# Patient Record
Sex: MALE | Race: WHITE | URBAN - METROPOLITAN AREA
[De-identification: names, ages, dates, MRNs, and addresses within clinical notes are randomized per-mention and may not be internally consistent; named-entity substitution may affect disease eponyms.]

---

## 2023-05-02 ENCOUNTER — OFFICE VISIT (OUTPATIENT)
Dept: PRIMARY CARE | Facility: CLINIC | Age: 73
End: 2023-05-02
Payer: MEDICARE

## 2023-05-02 VITALS
DIASTOLIC BLOOD PRESSURE: 72 MMHG | HEART RATE: 82 BPM | RESPIRATION RATE: 18 BRPM | WEIGHT: 277 LBS | OXYGEN SATURATION: 97 % | BODY MASS INDEX: 32.85 KG/M2 | SYSTOLIC BLOOD PRESSURE: 116 MMHG

## 2023-05-02 DIAGNOSIS — K58.9 IRRITABLE BOWEL SYNDROME, UNSPECIFIED TYPE: ICD-10-CM

## 2023-05-02 DIAGNOSIS — E55.9 VITAMIN D DEFICIENCY: ICD-10-CM

## 2023-05-02 DIAGNOSIS — I25.2 STATUS POST MYOCARDIAL INFARCTION: ICD-10-CM

## 2023-05-02 DIAGNOSIS — Z00.00 ROUTINE GENERAL MEDICAL EXAMINATION AT HEALTH CARE FACILITY: Primary | ICD-10-CM

## 2023-05-02 DIAGNOSIS — J45.909 ASTHMA, UNSPECIFIED ASTHMA SEVERITY, UNSPECIFIED WHETHER COMPLICATED, UNSPECIFIED WHETHER PERSISTENT (HHS-HCC): ICD-10-CM

## 2023-05-02 DIAGNOSIS — E78.5 HYPERLIPIDEMIA, UNSPECIFIED HYPERLIPIDEMIA TYPE: ICD-10-CM

## 2023-05-02 DIAGNOSIS — I25.10 CORONARY ARTERY DISEASE INVOLVING NATIVE HEART, UNSPECIFIED VESSEL OR LESION TYPE, UNSPECIFIED WHETHER ANGINA PRESENT: ICD-10-CM

## 2023-05-02 DIAGNOSIS — Z86.711 HISTORY OF PULMONARY EMBOLISM: ICD-10-CM

## 2023-05-02 DIAGNOSIS — E53.8 VITAMIN B12 DEFICIENCY: ICD-10-CM

## 2023-05-02 DIAGNOSIS — N40.0 BENIGN PROSTATIC HYPERPLASIA, UNSPECIFIED WHETHER LOWER URINARY TRACT SYMPTOMS PRESENT: ICD-10-CM

## 2023-05-02 DIAGNOSIS — R06.09 DYSPNEA ON EXERTION: ICD-10-CM

## 2023-05-02 DIAGNOSIS — I65.29 OCCLUSION OF CAROTID ARTERY, UNSPECIFIED LATERALITY: ICD-10-CM

## 2023-05-02 DIAGNOSIS — I77.819 AORTIC ECTASIA (CMS-HCC): ICD-10-CM

## 2023-05-02 DIAGNOSIS — I73.9 PERIPHERAL VASCULAR DISEASE (CMS-HCC): ICD-10-CM

## 2023-05-02 DIAGNOSIS — I10 ESSENTIAL HYPERTENSION: ICD-10-CM

## 2023-05-02 PROBLEM — R42 VERTIGO: Status: ACTIVE | Noted: 2023-05-02

## 2023-05-02 PROCEDURE — G0439 PPPS, SUBSEQ VISIT: HCPCS | Performed by: FAMILY MEDICINE

## 2023-05-02 RX ORDER — HYDROCHLOROTHIAZIDE 12.5 MG/1
CAPSULE ORAL
COMMUNITY
Start: 2019-02-26

## 2023-05-02 RX ORDER — CHLORDIAZEPOXIDE HYDROCHLORIDE AND CLIDINIUM BROMIDE 5; 2.5 MG/1; MG/1
CAPSULE ORAL
COMMUNITY
End: 2023-05-02 | Stop reason: ALTCHOICE

## 2023-05-02 RX ORDER — ATORVASTATIN CALCIUM 80 MG/1
TABLET, FILM COATED ORAL
COMMUNITY
Start: 2020-09-21

## 2023-05-02 RX ORDER — HYOSCYAMINE SULFATE 0.38 MG/1
TABLET, EXTENDED RELEASE ORAL
COMMUNITY
Start: 2018-11-07

## 2023-05-02 RX ORDER — ASPIRIN 81 MG/1
TABLET ORAL
COMMUNITY
Start: 2018-04-12

## 2023-05-02 RX ORDER — ALBUTEROL SULFATE 90 UG/1
AEROSOL, METERED RESPIRATORY (INHALATION) EVERY 6 HOURS
COMMUNITY
Start: 2021-03-18

## 2023-05-02 RX ORDER — FLUTICASONE FUROATE AND VILANTEROL 200; 25 UG/1; UG/1
1 POWDER RESPIRATORY (INHALATION) DAILY
COMMUNITY

## 2023-05-02 RX ORDER — LOSARTAN POTASSIUM 25 MG/1
TABLET ORAL
COMMUNITY

## 2023-05-02 RX ORDER — ALBUTEROL SULFATE 0.83 MG/ML
SOLUTION RESPIRATORY (INHALATION)
COMMUNITY

## 2023-05-02 RX ORDER — MONTELUKAST SODIUM 10 MG/1
TABLET ORAL
COMMUNITY
Start: 2021-04-26

## 2023-05-02 ASSESSMENT — ACTIVITIES OF DAILY LIVING (ADL)
DOING_HOUSEWORK: INDEPENDENT
GROCERY_SHOPPING: INDEPENDENT
DRESSING: INDEPENDENT
BATHING: INDEPENDENT
TAKING_MEDICATION: INDEPENDENT
MANAGING_FINANCES: INDEPENDENT

## 2023-05-02 ASSESSMENT — PATIENT HEALTH QUESTIONNAIRE - PHQ9
2. FEELING DOWN, DEPRESSED OR HOPELESS: NOT AT ALL
SUM OF ALL RESPONSES TO PHQ9 QUESTIONS 1 AND 2: 0
1. LITTLE INTEREST OR PLEASURE IN DOING THINGS: NOT AT ALL

## 2023-05-02 ASSESSMENT — ENCOUNTER SYMPTOMS
OCCASIONAL FEELINGS OF UNSTEADINESS: 0
DEPRESSION: 0
LOSS OF SENSATION IN FEET: 0

## 2023-05-02 NOTE — PROGRESS NOTES
Subjective   Reason for Visit: Piero Pires is an 72 y.o. male here for a Medicare Wellness visit.          Reviewed all medications by prescribing practitioner or clinical pharmacist (such as prescriptions, OTCs, herbal therapies and supplements) and documented in the medical record.    HPI    Patient Self Assessment of Health Status  Patient Self Assessment: Good    Nutrition and Exercise  Current Diet: Well Balanced Diet  Adequate Fluid Intake: Yes  Caffeine: Yes  Exercise Frequency: No Exercise         Home Safety Risk Factors: None    Patient Care Team:  Haider Lloyd DO as PCP - General  Haider Lloyd DO as PCP - [employer] PCP     Review of Systems    Objective   Vitals:  /72   Pulse 82   Resp 18   Wt 126 kg (277 lb)   SpO2 97%   BMI 32.85 kg/m²       Physical Exam    Assessment/Plan   Problem List Items Addressed This Visit    None

## 2023-05-02 NOTE — PROGRESS NOTES
Subjective   Reason for Visit: Piero Pires is an 72 y.o. male here for a Medicare Wellness visit.          Reviewed all medications by prescribing practitioner or clinical pharmacist (such as prescriptions, OTCs, herbal therapies and supplements) and documented in the medical record.    HPI  Patient comes in today for Medicare wellness exam.  He is currently without complaints.  He sees Dr. Quijano, pulmonary for his lung issues and sees Dr. Dodge, cardiology for his heart.  He is taking all his medicines as directed.  He states he gets an injection once a month at Dr. Quijano's office for his lungs.        2/7/2023  Patient comes in today for checkup and refill of medications. He continues to have problems with his ears and hearing but he does not yet want to see audiology.    3/16/2022  Patient comes in today for checkup and evaluation of his ears. He was in to see Dr. Quijano his pulmonologist yesterday and was told he has wax in both ears. He is having difficulty hearing but is otherwise without complaints. He states his breathing is still an issue and they have told him that he has had asthma. He states he does use a nebulizer with some success and he has a new inhaler that he takes at bedtime along with the montelukast. He states he has been getting dizzy in the evening and he is not sure what from. The pulmonologist did not think it was from any of his medicines.    6/22/2021  Patient comes in today with his wife for Medicare wellness exam. He continues to carreno shortness of breath and had an appointment with Dr. Quijano his pulmonologist this morning. He was taken off of Trelegy because of voice problems. He continues to see Dr. Pastor, cardiology and Dr. Rojo, cardiology. He otherwise is without complaints. He is taking all his medicines as directed without side effects.    5/21/2020  Patient is seen today as a telemedicine visit rendered via realtime interactive audio/video [email or web address] services as we  "are currently in the middle of a coronavirus pandemic worldwide. The patient was informed about the telehealth clinical encounter including benefits to avoiding travel and limitations to the assessment. In person care may be recommended if needed. Telehealth sessions are not being recorded and personal health information is protected.     Piero presents today for checkup and refill of meds. He currently is without complaints. He states that he is taking his medicines as directed and is not having any side effects from them. He claims he did have lab work recently in January at [facility], [place] for his cardiologist. I will see if I can track them down.    7/10/2019   The patient is a 69 year old male who presents for a Recheck of Medication Evaluation. The patient feels well with no complaints (Pt is here for a follow up on his meds and refills.). Note for \"Medication Evaluation\": Patient comes in today with his wife for checkup and follow-up on his breathing. He went to see pulmonary as I recommended last visit and apparently Dr. Quijano has placed him on Singulair which is helping a little bit. He claims the inhaler that I gave him last visit to use episodically for exercise didn't help. He states Dr. Quijano thinks he may have allergies not asthma.    He complains of occasional trouble breathing ever since his open-heart surgery. This has been going on for 14-16 months. He has been evaluated by cardiology and pulmonary and so far no one can tell him why. He states that with the slightest bit of exercise he gets some shortness of breath. He states he has been told in the past he has a spray of blood clots in his right lung. He is on Xarelto chronically.    7/9/2018   The patient is a 68 year old male who presents with a complaint of a sore throat. The onset of the sore throat has been acute and has been occurring in a persistent pattern for 3 days. The course has been constant. There has been associated runny " "nose, nasal congestion, sore throat, headache, cough and sinus pain, while there has been no fever. Note for \"Sore throat\": patient comes in today with his wife complaining of a sore throat.  He states it began last Friday 7/6/18 and has gotten progressively worse.  he denies fever but has had sinus congestion and drainage which Benadryl has helped 4.  He has a dry hacking cough but denies GI symptoms.    Patient has had a difficult past few months.  He had open heart surgery at [facility] with a CABG x 3 on 4/3/18. his postop course was remarkable for postop atrial fibrillation which was successfully treated with Iv, then oral amiodarone and oral beta blockers. since that time he had a pulmonary embolus and kidney stones.  He also developed allergies to the amiodarone and metoprolol.  He is currently on Xarelto 20 mg daily.    1/2/18  Patient comes in today for checkup and refill of meds. He currently is without complaints. He states that he is taking his medicines as directed and is not having any side effects from them. The hyoscyamine seems to be working okay for his irritable bowel syndrome and he would like a refill. He is also in need of lab work. He is not fasting.    Patient would also like to try medication for erectile dysfunction. He was given a prescription for Viagra from his cardiologist but it wound up being too expensive.     He is a former .     Patient Care Team:  Haider Lloyd DO as PCP - General  Haider Lloyd DO as PCP - [employer] PCP     Review of Systems   All other systems reviewed and are negative.      Objective   Vitals:  /72   Pulse 82   Resp 18   Wt 126 kg (277 lb)   SpO2 97%   BMI 32.85 kg/m²       Physical Exam  Vitals reviewed.   Constitutional:       Appearance: He is well-developed.   HENT:      Head: Normocephalic and atraumatic.      Right Ear: Tympanic membrane, ear canal and external ear normal.      Left Ear: Tympanic " membrane, ear canal and external ear normal.      Nose: Nose normal.      Mouth/Throat:      Mouth: Mucous membranes are moist.      Pharynx: Oropharynx is clear.   Eyes:      Extraocular Movements: Extraocular movements intact.      Conjunctiva/sclera: Conjunctivae normal.      Pupils: Pupils are equal, round, and reactive to light.   Cardiovascular:      Rate and Rhythm: Normal rate and regular rhythm.      Heart sounds: Normal heart sounds. No murmur heard.  Pulmonary:      Effort: Pulmonary effort is normal.      Breath sounds: Normal breath sounds. No wheezing.   Abdominal:      General: Abdomen is flat. Bowel sounds are normal.      Palpations: Abdomen is soft.   Musculoskeletal:         General: Normal range of motion.   Skin:     General: Skin is warm and dry.   Neurological:      General: No focal deficit present.      Mental Status: He is alert and oriented to person, place, and time. Mental status is at baseline.   Psychiatric:         Mood and Affect: Mood normal.         Behavior: Behavior normal.         Thought Content: Thought content normal.         Judgment: Judgment normal.         Assessment/Plan   Problem List Items Addressed This Visit       Aortic ectasia (CMS/HCC)    Current Assessment & Plan     Follows with Dr. Dodge, cardiology         Asthma    Carotid artery occlusion    Coronary artery disease    Dyspnea on exertion    Essential hypertension    Relevant Orders    Comprehensive Metabolic Panel    History of pulmonary embolism    Hyperlipidemia    Relevant Orders    Lipid Panel    Irritable bowel syndrome    Peripheral vascular disease (CMS/HCC)    Current Assessment & Plan     Stable, no complaints.         Status post myocardial infarction    Vitamin D deficiency    Relevant Orders    Vitamin D, Total     Other Visit Diagnoses       Routine general medical examination at health care facility    -  Primary    Relevant Orders    Comprehensive Metabolic Panel    Vitamin B12 deficiency         Relevant Orders    CBC    Vitamin B12    Benign prostatic hyperplasia, unspecified whether lower urinary tract symptoms present        Relevant Orders    Prostate Specific Antigen        Will contact patient with lab results when available.  Continue follow-up with cardiology and pulmonary as scheduled.  Continue current meds as directed.  Follow up in 6 months for recheck if all remains stable, sooner if problems arise.  Medication list reconciled.  Thank you for visiting today!      Professional services: Some of this note was completed using Dragon voice technology and sometimes the software misinterprets words. This may include unintended errors with respect to translation of words, typographical errors or grammar errors which may not have been identified prior to finalization of the chart note. Please take this into account when reading the note. Thank you.

## 2023-05-09 ENCOUNTER — LAB (OUTPATIENT)
Dept: LAB | Facility: LAB | Age: 73
End: 2023-05-09
Payer: MEDICARE

## 2023-05-09 DIAGNOSIS — I10 ESSENTIAL HYPERTENSION: ICD-10-CM

## 2023-05-09 DIAGNOSIS — E53.8 VITAMIN B12 DEFICIENCY: ICD-10-CM

## 2023-05-09 DIAGNOSIS — Z00.00 ROUTINE GENERAL MEDICAL EXAMINATION AT HEALTH CARE FACILITY: ICD-10-CM

## 2023-05-09 DIAGNOSIS — N40.0 BENIGN PROSTATIC HYPERPLASIA, UNSPECIFIED WHETHER LOWER URINARY TRACT SYMPTOMS PRESENT: ICD-10-CM

## 2023-05-09 DIAGNOSIS — E55.9 VITAMIN D DEFICIENCY: ICD-10-CM

## 2023-05-09 DIAGNOSIS — E78.5 HYPERLIPIDEMIA, UNSPECIFIED HYPERLIPIDEMIA TYPE: ICD-10-CM

## 2023-05-09 LAB
ALANINE AMINOTRANSFERASE (SGPT) (U/L) IN SER/PLAS: 11 U/L (ref 10–52)
ALBUMIN (G/DL) IN SER/PLAS: 4.2 G/DL (ref 3.4–5)
ALKALINE PHOSPHATASE (U/L) IN SER/PLAS: 62 U/L (ref 33–136)
ANION GAP IN SER/PLAS: 14 MMOL/L (ref 10–20)
ASPARTATE AMINOTRANSFERASE (SGOT) (U/L) IN SER/PLAS: 17 U/L (ref 9–39)
BILIRUBIN TOTAL (MG/DL) IN SER/PLAS: 0.8 MG/DL (ref 0–1.2)
CALCIDIOL (25 OH VITAMIN D3) (NG/ML) IN SER/PLAS: 24 NG/ML
CALCIUM (MG/DL) IN SER/PLAS: 9 MG/DL (ref 8.6–10.3)
CARBON DIOXIDE, TOTAL (MMOL/L) IN SER/PLAS: 27 MMOL/L (ref 21–32)
CHLORIDE (MMOL/L) IN SER/PLAS: 105 MMOL/L (ref 98–107)
CHOLESTEROL (MG/DL) IN SER/PLAS: 148 MG/DL (ref 0–199)
CHOLESTEROL IN HDL (MG/DL) IN SER/PLAS: 53.8 MG/DL
CHOLESTEROL/HDL RATIO: 2.8
COBALAMIN (VITAMIN B12) (PG/ML) IN SER/PLAS: 638 PG/ML (ref 211–911)
CREATININE (MG/DL) IN SER/PLAS: 0.73 MG/DL (ref 0.5–1.3)
ERYTHROCYTE DISTRIBUTION WIDTH (RATIO) BY AUTOMATED COUNT: 13.4 % (ref 11.5–14.5)
ERYTHROCYTE MEAN CORPUSCULAR HEMOGLOBIN CONCENTRATION (G/DL) BY AUTOMATED: 33.6 G/DL (ref 32–36)
ERYTHROCYTE MEAN CORPUSCULAR VOLUME (FL) BY AUTOMATED COUNT: 98 FL (ref 80–100)
ERYTHROCYTES (10*6/UL) IN BLOOD BY AUTOMATED COUNT: 4.63 X10E12/L (ref 4.5–5.9)
GFR MALE: >90 ML/MIN/1.73M2
GLUCOSE (MG/DL) IN SER/PLAS: 104 MG/DL (ref 74–99)
HEMATOCRIT (%) IN BLOOD BY AUTOMATED COUNT: 45.6 % (ref 41–52)
HEMOGLOBIN (G/DL) IN BLOOD: 15.3 G/DL (ref 13.5–17.5)
LDL: 79 MG/DL (ref 0–99)
LEUKOCYTES (10*3/UL) IN BLOOD BY AUTOMATED COUNT: 5.4 X10E9/L (ref 4.4–11.3)
PLATELETS (10*3/UL) IN BLOOD AUTOMATED COUNT: 148 X10E9/L (ref 150–450)
POTASSIUM (MMOL/L) IN SER/PLAS: 4.5 MMOL/L (ref 3.5–5.3)
PROSTATE SPECIFIC AG (NG/ML) IN SER/PLAS: 0.37 NG/ML (ref 0–4)
PROTEIN TOTAL: 6.5 G/DL (ref 6.4–8.2)
SODIUM (MMOL/L) IN SER/PLAS: 141 MMOL/L (ref 136–145)
TRIGLYCERIDE (MG/DL) IN SER/PLAS: 78 MG/DL (ref 0–149)
UREA NITROGEN (MG/DL) IN SER/PLAS: 14 MG/DL (ref 6–23)
VLDL: 16 MG/DL (ref 0–40)

## 2023-05-09 PROCEDURE — 85027 COMPLETE CBC AUTOMATED: CPT

## 2023-05-09 PROCEDURE — 82306 VITAMIN D 25 HYDROXY: CPT

## 2023-05-09 PROCEDURE — 80061 LIPID PANEL: CPT

## 2023-05-09 PROCEDURE — 82607 VITAMIN B-12: CPT

## 2023-05-09 PROCEDURE — 80053 COMPREHEN METABOLIC PANEL: CPT

## 2023-05-09 PROCEDURE — 84153 ASSAY OF PSA TOTAL: CPT

## 2023-05-09 PROCEDURE — 36415 COLL VENOUS BLD VENIPUNCTURE: CPT

## 2023-05-18 ENCOUNTER — TELEPHONE (OUTPATIENT)
Dept: PRIMARY CARE | Facility: CLINIC | Age: 73
End: 2023-05-18
Payer: MEDICARE

## 2023-05-18 NOTE — TELEPHONE ENCOUNTER
----- Message from Haider Lloyd DO sent at 5/16/2023  7:56 AM EDT -----  Regarding: Labs  Please notify patient of low vitamin D of 24.  It should be between 30 and 100 the closer to 50 the better. It is an important vitamin for your heart, lungs, immune system and bones among other things in your body. Would recommend OTC vitamin D3 2000 units daily to get it into and keep it in the normal range and recheck in September 2023.      All the rest of the labs are good.  Continue current medicines and recheck in 1 year.  ----- Message -----  From: Lab, Background User  Sent: 5/9/2023   1:17 PM EDT  To: Haider Lloyd DO

## 2023-05-22 NOTE — TELEPHONE ENCOUNTER
Patient called back to go over his results with you.  Patient can be reached at [phone].    Thank You

## 2024-03-13 ENCOUNTER — OFFICE VISIT (OUTPATIENT)
Dept: PRIMARY CARE | Facility: CLINIC | Age: 74
End: 2024-03-13
Payer: MEDICARE

## 2024-03-13 VITALS
SYSTOLIC BLOOD PRESSURE: 103 MMHG | OXYGEN SATURATION: 94 % | HEART RATE: 92 BPM | WEIGHT: 270 LBS | BODY MASS INDEX: 32.02 KG/M2 | RESPIRATION RATE: 24 BRPM | TEMPERATURE: 97.7 F | DIASTOLIC BLOOD PRESSURE: 67 MMHG

## 2024-03-13 DIAGNOSIS — I25.10 CORONARY ARTERY DISEASE INVOLVING NATIVE HEART, UNSPECIFIED VESSEL OR LESION TYPE, UNSPECIFIED WHETHER ANGINA PRESENT: ICD-10-CM

## 2024-03-13 DIAGNOSIS — K58.0 IRRITABLE BOWEL SYNDROME WITH DIARRHEA: Primary | ICD-10-CM

## 2024-03-13 DIAGNOSIS — J45.50 SEVERE PERSISTENT ASTHMA, UNCOMPLICATED (MULTI): ICD-10-CM

## 2024-03-13 DIAGNOSIS — I73.9 PERIPHERAL VASCULAR DISEASE (CMS-HCC): ICD-10-CM

## 2024-03-13 DIAGNOSIS — J84.10 PULMONARY FIBROSIS, UNSPECIFIED (MULTI): ICD-10-CM

## 2024-03-13 PROCEDURE — 1036F TOBACCO NON-USER: CPT | Performed by: FAMILY MEDICINE

## 2024-03-13 PROCEDURE — 3078F DIAST BP <80 MM HG: CPT | Performed by: FAMILY MEDICINE

## 2024-03-13 PROCEDURE — 3074F SYST BP LT 130 MM HG: CPT | Performed by: FAMILY MEDICINE

## 2024-03-13 PROCEDURE — 99213 OFFICE O/P EST LOW 20 MIN: CPT | Performed by: FAMILY MEDICINE

## 2024-03-13 PROCEDURE — 1160F RVW MEDS BY RX/DR IN RCRD: CPT | Performed by: FAMILY MEDICINE

## 2024-03-13 PROCEDURE — 1159F MED LIST DOCD IN RCRD: CPT | Performed by: FAMILY MEDICINE

## 2024-03-13 RX ORDER — TEZEPELUMAB-EKKO 210 MG/1.9ML
210 INJECTION, SOLUTION SUBCUTANEOUS
COMMUNITY

## 2024-03-13 NOTE — PROGRESS NOTES
Subjective   Patient ID: Piero Pires is a 73 y.o. male who presents for Irritable Bowel Syndrome (Has been controlled for a while but has been having a flare up th last 4-5 days. And happening one after the other with only a couple normal days in between. ).    HPI   Patient comes in today complaining of his stomach aching all the time.  He feels gassy and bloated and this has been going on for about a month.  He does not feel like the Levobid is working any longer.  The IBS has been happening with diarrhea worse in the last 4 to 5 days.      5/2/2023  Patient comes in today for Medicare wellness exam.  He is currently without complaints.  He sees Dr. Quijano, pulmonary for his lung issues and sees Dr. Dodge, cardiology for his heart.  He is taking all his medicines as directed.  He states he gets an injection once a month at Dr. Quijano's office for his lungs.    Review of Systems   All other systems reviewed and are negative.      Objective   /67   Pulse 92   Temp 36.5 °C (97.7 °F)   Resp 24   Wt 122 kg (270 lb)   SpO2 94%   BMI 32.02 kg/m²     Physical Exam  Vitals reviewed.   Constitutional:       Appearance: He is well-developed.   HENT:      Head: Normocephalic and atraumatic.      Right Ear: Tympanic membrane, ear canal and external ear normal.      Left Ear: Tympanic membrane, ear canal and external ear normal.      Nose: Nose normal.      Mouth/Throat:      Mouth: Mucous membranes are moist.      Pharynx: Oropharynx is clear.   Eyes:      Extraocular Movements: Extraocular movements intact.      Conjunctiva/sclera: Conjunctivae normal.      Pupils: Pupils are equal, round, and reactive to light.   Cardiovascular:      Rate and Rhythm: Normal rate and regular rhythm.      Heart sounds: Normal heart sounds. No murmur heard.  Pulmonary:      Effort: Pulmonary effort is normal.      Breath sounds: Normal breath sounds. No wheezing.   Abdominal:      Palpations: Abdomen is soft.      Tenderness:  There is abdominal tenderness (Bloating and diffuse hyperactive bowel sounds and mild tenderness to palpation).   Musculoskeletal:         General: Normal range of motion.   Skin:     General: Skin is warm and dry.   Neurological:      General: No focal deficit present.      Mental Status: He is alert and oriented to person, place, and time. Mental status is at baseline.   Psychiatric:         Mood and Affect: Mood normal.         Behavior: Behavior normal.         Thought Content: Thought content normal.         Judgment: Judgment normal.       Assessment/Plan   Problem List Items Addressed This Visit             ICD-10-CM    Coronary artery disease I25.10    Irritable bowel syndrome - Primary K58.9    Relevant Medications    eluxadoline 75 mg tablet    Peripheral vascular disease (CMS/[facility]) I73.9    Severe persistent asthma, uncomplicated J45.50    Pulmonary fibrosis, unspecified (CMS/[facility]) J84.10   DC hyoscyamine.  Take new medication as directed.  Continue other medications as directed.  RTC in 2 weeks for recheck, sooner should problems arise.  Continue current meds as directed.  Follow up in 2 months for recheck if all remains stable, sooner if problems arise.  Medication list reconciled.  Thank you for visiting today!      Professional services: Some of this note was completed using Dragon voice technology and sometimes the software misinterprets words. This may include unintended errors with respect to translation of words, typographical errors or grammar errors which may not have been identified prior to finalization of the chart note. Please take this into account when reading the note. Thank you.

## 2024-03-19 ENCOUNTER — TELEPHONE (OUTPATIENT)
Dept: PRIMARY CARE | Facility: CLINIC | Age: 74
End: 2024-03-19
Payer: MEDICARE

## 2024-03-19 DIAGNOSIS — K58.0 IRRITABLE BOWEL SYNDROME WITH DIARRHEA: Primary | ICD-10-CM

## 2024-03-19 RX ORDER — CHOLESTYRAMINE 4 G/9G
1 POWDER, FOR SUSPENSION ORAL
Qty: 90 PACKET | Refills: 1 | Status: SHIPPED | OUTPATIENT
Start: 2024-03-19 | End: 2025-03-19

## 2024-03-19 NOTE — TELEPHONE ENCOUNTER
Patient is calling in to state that the Eluxadoline is too expensive and he can not afford it.  Patient is asking if there is another prescription that can be sent to the pharmacy that is less expensive.  Patient can be reached at [phone].        Thank You

## 2024-03-25 ENCOUNTER — TELEPHONE (OUTPATIENT)
Dept: PRIMARY CARE | Facility: CLINIC | Age: 74
End: 2024-03-25
Payer: MEDICARE

## 2024-03-25 NOTE — TELEPHONE ENCOUNTER
Willard from the [facility] Medical Examiners office is calling and stating the patient has passed away and is asking if the doctor would sign off on the death certificate?  Willard can be reached at [phone].      Thank You

## 2024-03-28 NOTE — TELEPHONE ENCOUNTER
I spoke with the [employer] medical examiner's office and will sign the death certificate.  The death certificate was completed online Thursday, 3/28/2024.

## 2024-10-02 ENCOUNTER — TELEPHONE (OUTPATIENT)
Dept: PRIMARY CARE | Facility: CLINIC | Age: 74
End: 2024-10-02